# Patient Record
Sex: MALE | Race: WHITE | URBAN - METROPOLITAN AREA
[De-identification: names, ages, dates, MRNs, and addresses within clinical notes are randomized per-mention and may not be internally consistent; named-entity substitution may affect disease eponyms.]

---

## 2018-09-05 ENCOUNTER — HISTORICAL (OUTPATIENT)
Dept: ADMINISTRATIVE | Facility: HOSPITAL | Age: 63
End: 2018-09-05

## 2018-09-05 LAB
APPEARANCE, UA: CLEAR
BACTERIA #/AREA URNS AUTO: ABNORMAL /HPF
BILIRUB UR QL STRIP: NEGATIVE
COLOR UR: YELLOW
GLUCOSE (UA): NEGATIVE
HGB UR QL STRIP: ABNORMAL
KETONES UR QL STRIP: NEGATIVE
LEUKOCYTE ESTERASE UR QL STRIP: ABNORMAL
NITRITE UR QL STRIP.AUTO: NEGATIVE
PH UR STRIP: 5.5 [PH] (ref 5–9)
PROT UR QL STRIP: ABNORMAL
PSA SERPL-MCNC: 2.18 NG/ML (ref 0–4)
RBC #/AREA URNS HPF: 11 /HPF (ref 0–2)
SP GR UR STRIP: 1.02 (ref 1–1.03)
SQUAMOUS EPITHELIAL, UA: 9 /HPF (ref 0–4)
UROBILINOGEN UR STRIP-ACNC: 0.2
WBC #/AREA URNS AUTO: 17 /HPF (ref 0–3)

## 2018-09-07 LAB — FINAL CULTURE: NO GROWTH

## 2019-01-18 ENCOUNTER — HISTORICAL (OUTPATIENT)
Dept: ADMINISTRATIVE | Facility: HOSPITAL | Age: 64
End: 2019-01-18

## 2019-01-31 ENCOUNTER — HISTORICAL (OUTPATIENT)
Dept: RESPIRATORY THERAPY | Facility: HOSPITAL | Age: 64
End: 2019-01-31

## 2022-04-10 ENCOUNTER — HISTORICAL (OUTPATIENT)
Dept: ADMINISTRATIVE | Facility: HOSPITAL | Age: 67
End: 2022-04-10

## 2022-04-26 VITALS
SYSTOLIC BLOOD PRESSURE: 138 MMHG | WEIGHT: 226 LBS | DIASTOLIC BLOOD PRESSURE: 72 MMHG | OXYGEN SATURATION: 96 % | BODY MASS INDEX: 32.35 KG/M2 | HEIGHT: 70 IN

## 2024-08-27 DIAGNOSIS — R42 POST-CONCUSSION VERTIGO: Primary | ICD-10-CM

## 2024-08-27 DIAGNOSIS — F07.81 POST-CONCUSSION VERTIGO: Primary | ICD-10-CM

## 2024-09-03 ENCOUNTER — OFFICE VISIT (OUTPATIENT)
Dept: NEUROLOGY | Facility: CLINIC | Age: 69
End: 2024-09-03
Payer: MEDICARE

## 2024-09-03 VITALS
DIASTOLIC BLOOD PRESSURE: 84 MMHG | BODY MASS INDEX: 25.77 KG/M2 | HEIGHT: 70 IN | WEIGHT: 180 LBS | SYSTOLIC BLOOD PRESSURE: 118 MMHG

## 2024-09-03 DIAGNOSIS — F07.81 POST-CONCUSSION VERTIGO: Primary | ICD-10-CM

## 2024-09-03 DIAGNOSIS — Z86.73 HISTORY OF STROKE WITHOUT RESIDUAL DEFICITS: ICD-10-CM

## 2024-09-03 DIAGNOSIS — G62.9 PERIPHERAL POLYNEUROPATHY: ICD-10-CM

## 2024-09-03 DIAGNOSIS — R42 POST-CONCUSSION VERTIGO: Primary | ICD-10-CM

## 2024-09-03 DIAGNOSIS — G90.1 DYSAUTONOMIA: ICD-10-CM

## 2024-09-03 PROCEDURE — 99214 OFFICE O/P EST MOD 30 MIN: CPT | Mod: PBBFAC | Performed by: SPECIALIST

## 2024-09-03 PROCEDURE — 99999 PR PBB SHADOW E&M-EST. PATIENT-LVL IV: CPT | Mod: PBBFAC,,, | Performed by: SPECIALIST

## 2024-09-03 RX ORDER — IBUPROFEN 100 MG/5ML
1000 SUSPENSION, ORAL (FINAL DOSE FORM) ORAL DAILY
COMMUNITY

## 2024-09-03 RX ORDER — ALPRAZOLAM 0.25 MG/1
1 TABLET ORAL NIGHTLY PRN
COMMUNITY
Start: 2024-07-02

## 2024-09-03 RX ORDER — ZINC GLUCONATE 50 MG
1 TABLET ORAL EVERY MORNING
COMMUNITY

## 2024-09-03 RX ORDER — SOTALOL HYDROCHLORIDE 80 MG/1
80 TABLET ORAL 2 TIMES DAILY
COMMUNITY

## 2024-09-03 RX ORDER — GABAPENTIN 600 MG/1
600 TABLET ORAL 3 TIMES DAILY
COMMUNITY
Start: 2024-07-17

## 2024-09-03 RX ORDER — TAMSULOSIN HYDROCHLORIDE 0.4 MG/1
1 CAPSULE ORAL NIGHTLY
COMMUNITY
Start: 2024-08-16

## 2024-09-03 RX ORDER — TESTOSTERONE CYPIONATE 200 MG/ML
INJECTION, SOLUTION INTRAMUSCULAR
COMMUNITY
Start: 2024-05-03

## 2024-09-03 RX ORDER — CALCIUM CARB/VITAMIN D3/VIT K1 500-500-40
1000 TABLET,CHEWABLE ORAL 2 TIMES DAILY
COMMUNITY

## 2024-09-03 RX ORDER — LANOLIN ALCOHOL/MO/W.PET/CERES
1 CREAM (GRAM) TOPICAL EVERY MORNING
COMMUNITY

## 2024-09-03 RX ORDER — MULTIVITAMIN
1 TABLET ORAL EVERY MORNING
COMMUNITY

## 2024-09-03 RX ORDER — FLUDROCORTISONE ACETATE 0.1 MG/1
0.1 TABLET ORAL DAILY
COMMUNITY
Start: 2024-08-22

## 2024-09-03 RX ORDER — PANTOPRAZOLE SODIUM 40 MG/1
1 TABLET, DELAYED RELEASE ORAL DAILY
COMMUNITY
Start: 2024-06-27

## 2024-09-03 RX ORDER — CYANOCOBALAMIN 1000 UG/ML
1000 INJECTION, SOLUTION INTRAMUSCULAR; SUBCUTANEOUS WEEKLY
COMMUNITY
Start: 2024-08-15

## 2024-09-03 RX ORDER — ROSUVASTATIN CALCIUM 20 MG/1
1 TABLET, COATED ORAL NIGHTLY
COMMUNITY
Start: 2024-06-03 | End: 2025-06-03

## 2024-09-03 RX ORDER — FINASTERIDE 5 MG/1
1 TABLET, FILM COATED ORAL DAILY
COMMUNITY
Start: 2024-08-16

## 2024-09-03 RX ORDER — TADALAFIL 20 MG/1
20 TABLET ORAL DAILY PRN
COMMUNITY
Start: 2024-06-03

## 2024-09-03 NOTE — PROGRESS NOTES
"  Neurology Follow up Note    Subjective:       Patient ID: Benitez Lombardi is a 68 y.o. male.    Chief Complaint: New pt for post concussion vertigo eval     HPI:              Here for post concussion vertigo eval  Pt reports fall on January 8 where he hit his head on concrete; eval @ [facility] 11 days later, MRI Brain, CT Head, CT cervical, CT sinus done.   Burning sensation to top of head for weeks after fall. Dizziness daily, mostly when bending down or looking up. Dizziness exacerbated when rising too fast. Feels unsteady on his feet. Head feels "cloudy".  C/o headaches.      He was on prozac for a month, prescribed by Dr. Sellers and stopped it on his own. He was diagnosed with dysautonomia and is currently on fludrocortisone. Other recent adjustments to BP meds were made approx one month ago. Reports blood pressure is more "well balanced" now but still experiencing a swaying feeling.  Wears compression stockings daily.     ROS: as per HPI, otherwise pertinent systems review is negative        Retired  Lives with wife  Past Medical History:   Diagnosis Date    Atrial fibrillation     Stroke        Past Surgical History:   Procedure Laterality Date    NO PAST SURGERIES         Family History   Family history unknown: Yes       Social History     Socioeconomic History    Marital status:    Tobacco Use    Smoking status: Never    Smokeless tobacco: Never   Substance and Sexual Activity    Alcohol use: Yes    Drug use: Never       Review of patient's allergies indicates:  No Known Allergies    Current Outpatient Medications   Medication Instructions    ALPRAZolam (XANAX) 0.25 MG tablet 1 tablet, Oral, Nightly PRN    ascorbic acid (vitamin C) (VITAMIN C) 1,000 mg, Oral, Daily    chromium picolinate 1,000 mcg, Oral, 2 times daily    cyanocobalamin 1,000 mcg, Subcutaneous, Weekly    finasteride (PROSCAR) 5 mg tablet 1 tablet, Oral, Daily    fludrocortisone (FLORINEF) 0.1 mg, Oral, Daily    gabapentin " "(NEURONTIN) 600 mg, Oral, 3 times daily    magnesium oxide (MAG-OX) 400 mg (241.3 mg magnesium) tablet 1 tablet, Oral, Every morning    multivitamin (THERAGRAN) per tablet 1 tablet, Oral, Every morning    pantoprazole (PROTONIX) 40 MG tablet 1 tablet, Oral, Daily    rivaroxaban (XARELTO) 20 mg Tab 1 tablet, Oral, Nightly    rosuvastatin (CRESTOR) 20 MG tablet 1 tablet, Oral, Nightly    sotaloL (BETAPACE) 80 mg, Oral, 2 times daily    tadalafiL (CIALIS) 20 mg, Oral, Daily PRN    tamsulosin (FLOMAX) 0.4 mg Cap 1 capsule, Oral, Nightly    testosterone cypionate (DEPOTESTOTERONE CYPIONATE) 200 mg/mL injection SMARTSI.4 Milliliter(s) IM Once a Week    zinc gluconate 50 mg tablet 1 tablet, Oral, Every morning       Objective:      Exam:   Visit Vitals  /84 (BP Location: Left arm, Patient Position: Sitting)   Ht 5' 10" (1.778 m)   Wt 81.6 kg (180 lb)   BMI 25.83 kg/m²       Physical Exam  Constitutional:         Appearance: No acute distress, well developed & well nourished    Accompanied by: wife  HENT:       Head: normocephalic/atraumatic  Cardiovascular:        Rate and Rhythm: Irregular, no murmur      Peripheral extremities: no edema  Pulmonary:       Effort: Pulmonary effort is normal       Breath sounds: clear breath sounds   Musculoskeletal:           General: Normal range of motion  Skin:       General: Skin is warm and dry, appropriate color for ethnicity, no obvious lesions  Psychiatric:           Mood and Affect: normal        Behavior: Behavior normal, cooperative      Neuro exam:    Mental status:  The patient is alert and oriented to person, place and time.  Language is intact and fluent  Normal attention and concentration  Cranial Nerves:  EOM intact, no nystagmus, no ptosis, no gaze preference or deviation    Visual fields are full to confrontation testing  Facial movement is symmetric, normal cheek puff  Hearing is intact   Coordination:     Finger to nose - normal and symmetric bilaterally  No " dysmetria  Motor:  Normal muscle bulk and symmetry  5/5 in bilateral upper and lower extremities  No lateralizing features  Tone:   Normal, no ankle clonus  Gait:  Romberg - negative  Unassisted, careful but steady, normal arm swing  Sensory:   Vib absent in bilateral hands and feet (reported hx neuropathy)  Pinprick absent in bilateral hands and feet; present in arms, legs, and face  Reflexes:   Knee jerk: 1+ on R, absent L    Ankle: absent   Plantars:  silent  Tremor: none    Review of Data:   Prior notes reviewed     Imaging:  No results found for this or any previous visit.  Had imaging done at [facility]--imaging viewed and interpreted by Dr. Ramos, see addendum for his comments      Assessment/Plan:   1. Post-concussion vertigo  No new orders at this time. Hopeful reassurance given.    2. Peripheral polyneuropathy    3. Dysautonomia  We discussed that getting off of urologic medications may help these symptoms (flomax)--defer to urology.  Falls risk discussed.     4. History of Stroke without residual deficits  On xarelto. We discussed aspirin but advised against this given his risk of falls with current vertigo and balance issues    Follow-up F to F in 6 months    Dr. Ramos physically present in exam room during patient encounter.   I, Radha Flores, MARY acted solely as a scribe for and in the presence of Dr. Ramos who performed the service.     Radha Flores, MSN, APRN, FNP-C  [facility]  [phone]

## 2025-02-03 ENCOUNTER — LAB VISIT (OUTPATIENT)
Dept: LAB | Facility: HOSPITAL | Age: 70
End: 2025-02-03
Attending: PODIATRIST
Payer: MEDICARE

## 2025-02-03 DIAGNOSIS — E11.42 DIABETIC POLYNEUROPATHY: Primary | ICD-10-CM

## 2025-02-03 LAB
GLUCOSE 1H P 100 G GLC PO SERPL-MCNC: 245 MG/DL (ref 100–180)
GLUCOSE 2H P 100 G GLC PO SERPL-MCNC: 145 MG/DL (ref 70–140)
GLUCOSE 3H P 100 G GLC PO SERPL-MCNC: 81 MG/DL (ref 70–115)
GLUCOSE P FAST SERPL-MCNC: 114 MG/DL (ref 70–100)
PATH REV: NORMAL

## 2025-02-03 PROCEDURE — 82950 GLUCOSE TEST: CPT

## 2025-02-03 PROCEDURE — 82951 GLUCOSE TOLERANCE TEST (GTT): CPT

## 2025-02-03 PROCEDURE — 36415 COLL VENOUS BLD VENIPUNCTURE: CPT

## 2025-02-03 PROCEDURE — 82947 ASSAY GLUCOSE BLOOD QUANT: CPT

## 2025-02-04 LAB — POCT GLUCOSE: 105 MG/DL (ref 70–110)

## 2025-08-23 ENCOUNTER — PATIENT MESSAGE (OUTPATIENT)
Dept: RESEARCH | Facility: HOSPITAL | Age: 70
End: 2025-08-23
Payer: MEDICARE